# Patient Record
Sex: MALE | Race: WHITE | NOT HISPANIC OR LATINO | URBAN - METROPOLITAN AREA
[De-identification: names, ages, dates, MRNs, and addresses within clinical notes are randomized per-mention and may not be internally consistent; named-entity substitution may affect disease eponyms.]

---

## 2020-08-04 ENCOUNTER — OFFICE VISIT (OUTPATIENT)
Dept: URBAN - METROPOLITAN AREA CLINIC 23 | Facility: CLINIC | Age: 65
End: 2020-08-04
Payer: COMMERCIAL

## 2020-08-04 ENCOUNTER — LAB OUTSIDE AN ENCOUNTER (OUTPATIENT)
Dept: URBAN - METROPOLITAN AREA CLINIC 23 | Facility: CLINIC | Age: 65
End: 2020-08-04

## 2020-08-04 DIAGNOSIS — K74.60 CIRRHOSIS: ICD-10-CM

## 2020-08-04 PROCEDURE — 99213 OFFICE O/P EST LOW 20 MIN: CPT | Performed by: INTERNAL MEDICINE

## 2020-08-04 PROCEDURE — G8427 DOCREV CUR MEDS BY ELIG CLIN: HCPCS | Performed by: INTERNAL MEDICINE

## 2020-08-04 PROCEDURE — G9903 PT SCRN TBCO ID AS NON USER: HCPCS | Performed by: INTERNAL MEDICINE

## 2020-08-04 PROCEDURE — G8420 CALC BMI NORM PARAMETERS: HCPCS | Performed by: INTERNAL MEDICINE

## 2020-08-04 PROCEDURE — 3017F COLORECTAL CA SCREEN DOC REV: CPT | Performed by: INTERNAL MEDICINE

## 2020-08-04 RX ORDER — LISINOPRIL 30 MG/1
TAKE 1 TABLET (30 MG) BY ORAL ROUTE ONCE DAILY TABLET ORAL 1
Qty: 0 | Refills: 0 | Status: ACTIVE | COMMUNITY
Start: 1900-01-01

## 2020-08-04 RX ORDER — CARVEDILOL 12.5 MG/1
TABLET, FILM COATED ORAL
Qty: 0 | Refills: 0 | Status: ACTIVE | COMMUNITY
Start: 1900-01-01

## 2020-08-04 RX ORDER — FUROSEMIDE 40 MG/1
TABLET ORAL
Qty: 0 | Refills: 0 | Status: ACTIVE | COMMUNITY
Start: 1900-01-01

## 2020-08-04 RX ORDER — LISINOPRIL 40 MG/1
TABLET ORAL 1
Qty: 0 | Refills: 0 | Status: ACTIVE | COMMUNITY
Start: 1900-01-01

## 2020-08-04 NOTE — HPI-TODAY'S VISIT:
64-year-old male presented for follow up visit, he has history of hepatitis C cirrhosis which was treated and documented SVR. Recent liver enzymes in March 2020 was normal, presented today for Follow-up he had occasional pain mostly in the right upper and epigastric area pain is 2/10 into it it occasionally associated with bloating. He had EGD and colonoscopy in February 2020, his upper endoscopy was normal no varices his colonoscopy reveal few diverticulosis otherwise normal, his last ultrasound was 6 months ago, no nausea no vomiting no weight loss

## 2020-08-13 LAB
A/G RATIO: 1.7
AFP, SERUM, TUMOR MARKER: 5.4
ALBUMIN: 4.7
ALKALINE PHOSPHATASE: 74
ALT (SGPT): 8
AST (SGOT): 18
BASO (ABSOLUTE): 0.1
BASOS: 1
BILIRUBIN, TOTAL: 0.3
BUN/CREATININE RATIO: 18
BUN: 19
CALCIUM: 9.4
CARBON DIOXIDE, TOTAL: 21
CHLORIDE: 102
CREATININE: 1.05
EGFR IF AFRICN AM: 85
EGFR IF NONAFRICN AM: 74
EOS (ABSOLUTE): 0.2
EOS: 3
GLOBULIN, TOTAL: 2.8
GLUCOSE: 136
HEMATOCRIT: 40.8
HEMATOLOGY COMMENTS:: (no result)
HEMOGLOBIN: 13.5
IMMATURE CELLS: (no result)
IMMATURE GRANS (ABS): (no result)
IMMATURE GRANULOCYTES: (no result)
LYMPHS (ABSOLUTE): 2.2
LYMPHS: 37
MCH: 29.9
MCHC: 33.1
MCV: 91
MONOCYTES(ABSOLUTE): 0.9
MONOCYTES: 15
NEUTROPHILS (ABSOLUTE): 2.6
NEUTROPHILS: 44
NRBC: (no result)
PLATELETS: (no result)
POTASSIUM: 5.4
PROTEIN, TOTAL: 7.5
RBC: 4.51
RDW: 13.1
SODIUM: 139
WBC: 5.9

## 2020-09-22 ENCOUNTER — LAB OUTSIDE AN ENCOUNTER (OUTPATIENT)
Dept: URBAN - METROPOLITAN AREA CLINIC 92 | Facility: CLINIC | Age: 65
End: 2020-09-22

## 2020-09-22 ENCOUNTER — TELEPHONE ENCOUNTER (OUTPATIENT)
Dept: URBAN - METROPOLITAN AREA CLINIC 92 | Facility: CLINIC | Age: 65
End: 2020-09-22

## 2020-09-24 ENCOUNTER — WEB ENCOUNTER (OUTPATIENT)
Dept: URBAN - METROPOLITAN AREA CLINIC 92 | Facility: CLINIC | Age: 65
End: 2020-09-24

## 2020-10-07 ENCOUNTER — TELEPHONE ENCOUNTER (OUTPATIENT)
Dept: URBAN - METROPOLITAN AREA CLINIC 23 | Facility: CLINIC | Age: 65
End: 2020-10-07

## 2020-11-04 ENCOUNTER — TELEPHONE ENCOUNTER (OUTPATIENT)
Dept: URBAN - METROPOLITAN AREA CLINIC 92 | Facility: CLINIC | Age: 65
End: 2020-11-04

## 2020-11-06 ENCOUNTER — TELEPHONE ENCOUNTER (OUTPATIENT)
Dept: URBAN - METROPOLITAN AREA CLINIC 23 | Facility: CLINIC | Age: 65
End: 2020-11-06

## 2020-12-08 ENCOUNTER — TELEPHONE ENCOUNTER (OUTPATIENT)
Dept: URBAN - METROPOLITAN AREA MEDICAL CENTER 27 | Facility: MEDICAL CENTER | Age: 65
End: 2020-12-08

## 2020-12-17 ENCOUNTER — TELEPHONE ENCOUNTER (OUTPATIENT)
Dept: URBAN - METROPOLITAN AREA CLINIC 23 | Facility: CLINIC | Age: 65
End: 2020-12-17

## 2020-12-31 ENCOUNTER — LAB OUTSIDE AN ENCOUNTER (OUTPATIENT)
Dept: URBAN - METROPOLITAN AREA CLINIC 23 | Facility: CLINIC | Age: 65
End: 2020-12-31

## 2020-12-31 LAB
CREATININE POC: 1
PERFORMING LAB: (no result)

## 2021-01-29 ENCOUNTER — TELEPHONE ENCOUNTER (OUTPATIENT)
Dept: URBAN - METROPOLITAN AREA CLINIC 23 | Facility: CLINIC | Age: 66
End: 2021-01-29

## 2021-02-02 ENCOUNTER — OFFICE VISIT (OUTPATIENT)
Dept: URBAN - METROPOLITAN AREA CLINIC 23 | Facility: CLINIC | Age: 66
End: 2021-02-02
Payer: COMMERCIAL

## 2021-02-02 ENCOUNTER — TELEPHONE ENCOUNTER (OUTPATIENT)
Dept: URBAN - METROPOLITAN AREA CLINIC 23 | Facility: CLINIC | Age: 66
End: 2021-02-02

## 2021-02-02 DIAGNOSIS — K74.60 CIRRHOSIS: ICD-10-CM

## 2021-02-02 DIAGNOSIS — C22.0 HEPATOMA: ICD-10-CM

## 2021-02-02 PROCEDURE — G8482 FLU IMMUNIZE ORDER/ADMIN: HCPCS | Performed by: INTERNAL MEDICINE

## 2021-02-02 PROCEDURE — G9903 PT SCRN TBCO ID AS NON USER: HCPCS | Performed by: INTERNAL MEDICINE

## 2021-02-02 PROCEDURE — 99214 OFFICE O/P EST MOD 30 MIN: CPT | Performed by: INTERNAL MEDICINE

## 2021-02-02 PROCEDURE — G8427 DOCREV CUR MEDS BY ELIG CLIN: HCPCS | Performed by: INTERNAL MEDICINE

## 2021-02-02 PROCEDURE — 3017F COLORECTAL CA SCREEN DOC REV: CPT | Performed by: INTERNAL MEDICINE

## 2021-02-02 RX ORDER — LISINOPRIL 30 MG/1
TAKE 1 TABLET (30 MG) BY ORAL ROUTE ONCE DAILY TABLET ORAL 1
Qty: 0 | Refills: 0 | Status: ACTIVE | COMMUNITY
Start: 1900-01-01

## 2021-02-02 RX ORDER — CARVEDILOL 12.5 MG/1
TABLET, FILM COATED ORAL
Qty: 0 | Refills: 0 | Status: ACTIVE | COMMUNITY
Start: 1900-01-01

## 2021-02-02 RX ORDER — LISINOPRIL 40 MG/1
TABLET ORAL 1
Qty: 0 | Refills: 0 | Status: ACTIVE | COMMUNITY
Start: 1900-01-01

## 2021-02-02 RX ORDER — FUROSEMIDE 40 MG/1
TABLET ORAL
Qty: 0 | Refills: 0 | Status: ACTIVE | COMMUNITY
Start: 1900-01-01

## 2021-02-02 NOTE — HPI-TODAY'S VISIT:
65-year-old male presented For Follow-up after abnormal  ultrasound and MRI done for hepatoma screening. The patient has history of compensated  cirrhosis from hepatitis C which was treated and documented SVR in 2016 the hepatitis c and cirrhosis was treated and diagned by Dr Banks , . I saw him in  August 2020 he had screening ultrasound in September which revealed 1.5 cm New liver lesion in the right hepatic lobe. He had Follow-up MRI which was denied first by the insurance then after appeal  it was approved his MRI revealed 1.9 cm lesion in segment 6 and 2.7 enhancing lesion in segment 8 suspicious for hepatoma. He had right upper quadrant abdominal pain.  after the MRI  we had multiple attempts to contact the patient but he denied response to certified mail letter (did not sign it ) . We left several voice mail message  but he did not follow up til last week  He reports he was affraied of the results and did not want to hear it .   I had  long discussion with him in the office about the importance of Follow-up nd the seriousness of this finding    he had occasional pain mostly in the right upper and epigastric area pain is 2/10 into it it occasionally associated with bloating. He had EGD and colonoscopy in February 2020, his upper endoscopy was normal no varices his colonoscopy reveal few diverticulosis otherwise normal, his last ultrasound was 6 months ago, no nausea no vomiting no weight loss

## 2021-02-03 LAB
A/G RATIO: 1.6
AFP, SERUM, TUMOR MARKER: 4.9
ALBUMIN: 4.5
ALKALINE PHOSPHATASE: 81
AST (SGOT): 16
BASO (ABSOLUTE): 0.1
BASOS: 1
BILIRUBIN, TOTAL: 0.5
BUN/CREATININE RATIO: 21
BUN: 24
CALCIUM: 9.5
CARBON DIOXIDE, TOTAL: 22
CHLORIDE: 100
CREATININE: 1.15
EGFR IF AFRICN AM: 76
EGFR IF NONAFRICN AM: 65
EOS (ABSOLUTE): 0.3
EOS: 3
GLOBULIN, TOTAL: 2.9
GLUCOSE: 103
HEMATOCRIT: 39.4
HEMATOLOGY COMMENTS:: (no result)
HEMOGLOBIN: 12.7
IMMATURE CELLS: (no result)
IMMATURE GRANS (ABS): 0
IMMATURE GRANULOCYTES: 0
INR: 1.1
LYMPHS (ABSOLUTE): 3.2
LYMPHS: 42
MCH: 29.3
MCHC: 32.2
MCV: 91
MONOCYTES(ABSOLUTE): 0.8
MONOCYTES: 10
NEUTROPHILS (ABSOLUTE): 3.3
NEUTROPHILS: 44
NRBC: (no result)
PLATELETS: 263
POTASSIUM: 4.6
PROTEIN, TOTAL: 7.4
PROTHROMBIN TIME: 11.4
RBC: 4.34
RDW: 13.1
SODIUM: 138
WBC: 7.6

## 2021-02-11 ENCOUNTER — TELEPHONE ENCOUNTER (OUTPATIENT)
Dept: URBAN - METROPOLITAN AREA MEDICAL CENTER 27 | Facility: MEDICAL CENTER | Age: 66
End: 2021-02-11

## 2021-08-06 ENCOUNTER — OFFICE VISIT (OUTPATIENT)
Dept: URBAN - METROPOLITAN AREA CLINIC 23 | Facility: CLINIC | Age: 66
End: 2021-08-06
Payer: COMMERCIAL

## 2021-08-06 DIAGNOSIS — C22.0 HEPATOMA: ICD-10-CM

## 2021-08-06 DIAGNOSIS — K74.60 CIRRHOSIS: ICD-10-CM

## 2021-08-06 PROCEDURE — 99214 OFFICE O/P EST MOD 30 MIN: CPT | Performed by: INTERNAL MEDICINE

## 2021-08-06 RX ORDER — CARVEDILOL 12.5 MG/1
TABLET, FILM COATED ORAL
Qty: 0 | Refills: 0 | Status: ACTIVE | COMMUNITY
Start: 1900-01-01

## 2021-08-06 RX ORDER — LISINOPRIL 40 MG/1
TABLET ORAL 1
Qty: 0 | Refills: 0 | Status: ACTIVE | COMMUNITY
Start: 1900-01-01

## 2021-08-06 RX ORDER — LISINOPRIL 30 MG/1
TAKE 1 TABLET (30 MG) BY ORAL ROUTE ONCE DAILY TABLET ORAL 1
Qty: 0 | Refills: 0 | Status: ACTIVE | COMMUNITY
Start: 1900-01-01

## 2021-08-06 RX ORDER — FUROSEMIDE 40 MG/1
TABLET ORAL
Qty: 0 | Refills: 0 | Status: ACTIVE | COMMUNITY
Start: 1900-01-01

## 2021-08-06 NOTE — HPI-TODAY'S VISIT:
65-year-old male presented For Follow-up .  He was diagnosed with hepatocellular carcinoma in December 2020 by MRI.  He has history of hepatitis C cirrhosis now SVR imaging study revealed highly suspicious for hepatocellular carcinoma he was referred to [facility], his liver function test was normal meld score 9, MRI at [facility] revealed 2 lesion at 3.5 cm and 1.8 cm consistent with hepatocellular carcinoma in segment 6 and 8.  He had TACE treatment at [facility] responded to it well had follow-up MRI in May 2021 revealed small size lesion and good response to TACE treatment.  He had history of positive PPD was treated with INH for 6 months today feels much better no GI symptoms no abdominal pain no nausea no vomiting.  He had EGD and colonoscopy in February 2020 his upper endoscopy was unremarkable no varices his colonoscopy although was normal

## 2021-08-07 LAB
A/G RATIO: 1.5
AFP, SERUM, TUMOR MARKER: 3.5
ALBUMIN: 4.6
ALKALINE PHOSPHATASE: 178
AST (SGOT): 29
BASO (ABSOLUTE): 0.1
BASOS: 1
BILIRUBIN, TOTAL: 0.7
BUN/CREATININE RATIO: 27
BUN: 31
CALCIUM: 9.4
CARBON DIOXIDE, TOTAL: 22
CHLORIDE: 101
CREATININE: 1.16
EGFR IF AFRICN AM: 75
EGFR IF NONAFRICN AM: 65
EOS (ABSOLUTE): 0.2
EOS: 3
GLOBULIN, TOTAL: 3.1
GLUCOSE: 138
HEMATOCRIT: 40.6
HEMATOLOGY COMMENTS:: (no result)
HEMOGLOBIN: 13.6
IMMATURE CELLS: (no result)
IMMATURE GRANS (ABS): 0
IMMATURE GRANULOCYTES: 0
LYMPHS (ABSOLUTE): 1.4
LYMPHS: 26
MCH: 30.3
MCHC: 33.5
MCV: 90
MONOCYTES(ABSOLUTE): 0.5
MONOCYTES: 8
NEUTROPHILS (ABSOLUTE): 3.4
NEUTROPHILS: 62
NRBC: (no result)
PLATELETS: 245
POTASSIUM: 5
PROTEIN, TOTAL: 7.7
RBC: 4.49
RDW: 14
SODIUM: 136
WBC: 5.6

## 2021-08-10 ENCOUNTER — WEB ENCOUNTER (OUTPATIENT)
Dept: URBAN - METROPOLITAN AREA CLINIC 23 | Facility: CLINIC | Age: 66
End: 2021-08-10

## 2022-07-07 ENCOUNTER — OFFICE VISIT (OUTPATIENT)
Dept: URBAN - METROPOLITAN AREA CLINIC 33 | Facility: CLINIC | Age: 67
End: 2022-07-07
Payer: COMMERCIAL

## 2022-07-07 VITALS
SYSTOLIC BLOOD PRESSURE: 148 MMHG | HEIGHT: 67 IN | OXYGEN SATURATION: 93 % | BODY MASS INDEX: 27.15 KG/M2 | DIASTOLIC BLOOD PRESSURE: 84 MMHG | HEART RATE: 58 BPM | WEIGHT: 173 LBS

## 2022-07-07 DIAGNOSIS — C22.0 HEPATOMA: ICD-10-CM

## 2022-07-07 DIAGNOSIS — K74.60 CIRRHOSIS: ICD-10-CM

## 2022-07-07 DIAGNOSIS — R10.11 RIGHT UPPER QUADRANT ABDOMINAL PAIN: ICD-10-CM

## 2022-07-07 PROCEDURE — 99214 OFFICE O/P EST MOD 30 MIN: CPT | Performed by: INTERNAL MEDICINE

## 2022-07-07 RX ORDER — FUROSEMIDE 40 MG/1
TABLET ORAL
Qty: 0 | Refills: 0 | Status: DISCONTINUED | COMMUNITY
Start: 1900-01-01

## 2022-07-07 RX ORDER — CARVEDILOL 12.5 MG/1
TABLET, FILM COATED ORAL
Qty: 0 | Refills: 0 | Status: ACTIVE | COMMUNITY
Start: 1900-01-01

## 2022-07-07 RX ORDER — TESTOSTERONE CYPIONATE 200 MG/ML
INJECTION, SOLUTION INTRAMUSCULAR
Qty: 3 MILLILITER | Status: ACTIVE | COMMUNITY

## 2022-07-07 RX ORDER — ALBUTEROL SULFATE 108 UG/1
AEROSOL, METERED RESPIRATORY (INHALATION)
Qty: 6.7 GRAM | Status: ACTIVE | COMMUNITY

## 2022-07-07 RX ORDER — BUPROPION HYDROCHLORIDE 150 MG/1
TABLET, EXTENDED RELEASE ORAL
Qty: 90 TABLET | Status: ACTIVE | COMMUNITY

## 2022-07-07 RX ORDER — ATORVASTATIN CALCIUM 20 MG/1
TABLET, FILM COATED ORAL
Qty: 90 TABLET | Status: ACTIVE | COMMUNITY

## 2022-07-07 RX ORDER — LISINOPRIL 40 MG/1
TABLET ORAL 1
Qty: 0 | Refills: 0 | Status: ACTIVE | COMMUNITY
Start: 1900-01-01

## 2022-07-07 RX ORDER — TAMSULOSIN HYDROCHLORIDE 0.4 MG/1
CAPSULE ORAL
Qty: 90 CAPSULE | Status: ACTIVE | COMMUNITY

## 2022-07-07 NOTE — HPI-CIRRHOSIS
66 year old male presents today for a consultation and transition of care from Dr. Castelan for cirrhosis and liver cancer.  He was diagnosed with hepatocellular carcinoma in December 2020 via MRI. He has history of hepatitis C cirrhosis.  Patient state he was initially diagnosed with Hep C 40 years ago, at which time treated with  Interferon and Ribavirin.  SIx years ago Hep C positive again and treated with another medication he can not recall.  He report diagnosed with Cirrhosis of Liver 30 years ago and followed by GI Dr. Banks over the course of 15 years, till he retired then referred to Dr. Castelan who has followed patient over the past 6 years.  Last visit with Dr. Castelan (8/6/2021) .   He was referred to [facility] due to SVR imaging study revealed highly suspicious for hepatocellular carcinoma.  He has been followed by Dr. Marks over the past 3 years.  Last visit (2/2022) and repeat MRI Abdomen every 3 months.  He is scheduled for next MRI in Aug. 2022.  He had TACE treatment at [facility] responded to it well.  Follow-up MRI Abdomen on (5/28/2022) Stable treated lesions of the segment 8 and segment 6 liver without viable tumor (LR TR nonviable).  Multiple (at least 7) arterial enhancing foci throughout the liver parenchyma are unchanged without washout and remain indeterminate. Attention on follow-up imaging recommended (LR  3). Mild cirrhotic liver morphology with mild changes of portal hypertension in the form of upper abdominal varices.  Main portal vein is patent.  Other incidental and nonemergenct findings as above.  He reports a 3-year history of dizziness episodes.  Described as imbalance.  Bending over or over exertion has been an aggravating factor.  Sitting down to rest helps.  He has a 10 year h/o periodic episodes of RUQ.  Symptoms occur without provocation.  Described as a sharp pain that will last for seconds then dissipate.  He reports 40 years ago he had  a positive PPD was treated with INH for 6 months.  He had EGD in February 2020 his upper endoscopy was unremarkable .   Complications of liver disease include:  Jaundice: Hepatic Encephalopathy (lose of Brain function due to liver not working) : No Ascites (abdominal fluid): No Spontaneous Bacterial Peritonitis (ascites fluid infection): No Variceal hemorrhage (bleeding within the veins due to high B/P):No Portal Vein Thrombosis (blockage or narrowing of the portal vein): No Muscle Mass Loss:No Weight Loss:No Sleeping habits: Normal  He report a 10 year h/o periodic episodes of RUQ.  Symptoms occur without provocation.  Described as a sharp pain that will last for seconds then dissipate.

## 2022-08-29 ENCOUNTER — OFFICE VISIT (OUTPATIENT)
Dept: URBAN - METROPOLITAN AREA MEDICAL CENTER 27 | Facility: MEDICAL CENTER | Age: 67
End: 2022-08-29

## 2022-08-29 ENCOUNTER — CLAIMS CREATED FROM THE CLAIM WINDOW (OUTPATIENT)
Dept: URBAN - METROPOLITAN AREA MEDICAL CENTER 27 | Facility: MEDICAL CENTER | Age: 67
End: 2022-08-29
Payer: COMMERCIAL

## 2022-08-29 DIAGNOSIS — Z12.11 COLON CANCER SCREENING: ICD-10-CM

## 2022-08-29 PROCEDURE — 996 AG2 (NON BILLABLE): Performed by: INTERNAL MEDICINE

## 2022-09-16 ENCOUNTER — TELEPHONE ENCOUNTER (OUTPATIENT)
Dept: URBAN - METROPOLITAN AREA CLINIC 36 | Facility: CLINIC | Age: 67
End: 2022-09-16

## 2022-09-19 ENCOUNTER — OFFICE VISIT (OUTPATIENT)
Dept: URBAN - METROPOLITAN AREA CLINIC 33 | Facility: CLINIC | Age: 67
End: 2022-09-19

## 2022-09-19 NOTE — HPI-CIRRHOSIS
66 y/o male patient presents today for a follow up of cirrhosis. He admits any fatigue, easy bruising, decreased appetite, nausea, vomiting, edema, unintentional weight loss, or jaundice.   Most recent labs were completed --.   Admits/Denies any shortness of breath.   MELD Score: Complications of liver disease include: Jaundice:  Hepatic Encephalopathy: No Ascites: No Spontaneous Bacterial Peritonitis: No Variceal hemorrhage: No Portal Vein Thrombosis: No Muscle Mass Loss: No Weight Loss: No Sleeping habits: ?       Last visit (7/7/2022)  66 year old male presents today for a consultation and transition of care from Dr. Castelan for cirrhosis and liver cancer.  He was diagnosed with hepatocellular carcinoma in December 2020 via MRI. He has history of hepatitis C cirrhosis.  Patient state he was initially diagnosed with Hep C 40 years ago, at which time treated with  Interferon and Ribavirin.  SIx years ago Hep C positive again and treated with another medication he can not recall.  He report diagnosed with Cirrhosis of Liver 30 years ago and followed by GI Dr. Banks over the course of 15 years, till he retired then referred to Dr. Castelan who has followed patient over the past 6 years.  Last visit with Dr. Castelan (8/6/2021) .   He was referred to [facility] due to SVR imaging study revealed highly suspicious for hepatocellular carcinoma.  He has been followed by Dr. Marks over the past 3 years.  Last visit (2/2022) and repeat MRI Abdomen every 3 months.  He is scheduled for next MRI in Aug. 2022.  He had TACE treatment at [facility] responded to it well.  Follow-up MRI Abdomen on (5/28/2022) Stable treated lesions of the segment 8 and segment 6 liver without viable tumor (LR TR nonviable).  Multiple (at least 7) arterial enhancing foci throughout the liver parenchyma are unchanged without washout and remain indeterminate. Attention on follow-up imaging recommended (LR  3). Mild cirrhotic liver morphology with mild changes of portal hypertension in the form of upper abdominal varices.  Main portal vein is patent.  Other incidental and nonemergenct findings as above.  He reports a 3-year history of dizziness episodes.  Described as imbalance.  Bending over or over exertion has been an aggravating factor.  Sitting down to rest helps.  He has a 10 year h/o periodic episodes of RUQ.  Symptoms occur without provocation.  Described as a sharp pain that will last for seconds then dissipate.  He reports 40 years ago he had  a positive PPD was treated with INH for 6 months.  He had EGD in February 2020 his upper endoscopy was unremarkable .   Complications of liver disease include:  Jaundice: Hepatic Encephalopathy: No Ascites: No Spontaneous Bacterial Peritonitis: No Variceal hemorrhage: No Portal Vein Thrombosis: No Muscle Mass Loss: No Weight Loss: No Sleeping habits: Normal  He report a 10 year h/o periodic episodes of RUQ.  Symptoms occur without provocation.  Described as a sharp pain that will last for seconds then dissipate.

## 2022-09-26 ENCOUNTER — TELEPHONE ENCOUNTER (OUTPATIENT)
Dept: URBAN - METROPOLITAN AREA CLINIC 35 | Facility: CLINIC | Age: 67
End: 2022-09-26

## 2022-10-17 ENCOUNTER — OFFICE VISIT (OUTPATIENT)
Dept: URBAN - METROPOLITAN AREA MEDICAL CENTER 27 | Facility: MEDICAL CENTER | Age: 67
End: 2022-10-17
Payer: COMMERCIAL

## 2022-10-17 DIAGNOSIS — K29.30 CHRONIC SUPERFICIAL GASTRITIS: ICD-10-CM

## 2022-10-17 DIAGNOSIS — K22.89 DILATATION OF ESOPHAGUS: ICD-10-CM

## 2022-10-17 DIAGNOSIS — K74.69 CIRRHOSIS, CRYPTOGENIC: ICD-10-CM

## 2022-10-17 PROCEDURE — 43239 EGD BIOPSY SINGLE/MULTIPLE: CPT | Performed by: INTERNAL MEDICINE

## 2022-10-17 RX ORDER — CARVEDILOL 12.5 MG/1
TABLET, FILM COATED ORAL
Qty: 0 | Refills: 0 | Status: ACTIVE | COMMUNITY
Start: 1900-01-01

## 2022-10-17 RX ORDER — ATORVASTATIN CALCIUM 20 MG/1
TABLET, FILM COATED ORAL
Qty: 90 TABLET | Status: ACTIVE | COMMUNITY

## 2022-10-17 RX ORDER — BUPROPION HYDROCHLORIDE 150 MG/1
TABLET, EXTENDED RELEASE ORAL
Qty: 90 TABLET | Status: ACTIVE | COMMUNITY

## 2022-10-17 RX ORDER — TESTOSTERONE CYPIONATE 200 MG/ML
INJECTION, SOLUTION INTRAMUSCULAR
Qty: 3 MILLILITER | Status: ACTIVE | COMMUNITY

## 2022-10-17 RX ORDER — LISINOPRIL 40 MG/1
TABLET ORAL 1
Qty: 0 | Refills: 0 | Status: ACTIVE | COMMUNITY
Start: 1900-01-01

## 2022-10-17 RX ORDER — TAMSULOSIN HYDROCHLORIDE 0.4 MG/1
CAPSULE ORAL
Qty: 90 CAPSULE | Status: ACTIVE | COMMUNITY

## 2022-10-17 RX ORDER — ALBUTEROL SULFATE 108 UG/1
AEROSOL, METERED RESPIRATORY (INHALATION)
Qty: 6.7 GRAM | Status: ACTIVE | COMMUNITY

## 2022-11-10 ENCOUNTER — OFFICE VISIT (OUTPATIENT)
Dept: URBAN - METROPOLITAN AREA CLINIC 33 | Facility: CLINIC | Age: 67
End: 2022-11-10
Payer: COMMERCIAL

## 2022-11-10 VITALS
WEIGHT: 165.8 LBS | HEART RATE: 95 BPM | SYSTOLIC BLOOD PRESSURE: 125 MMHG | BODY MASS INDEX: 26.02 KG/M2 | DIASTOLIC BLOOD PRESSURE: 82 MMHG | HEIGHT: 67 IN | OXYGEN SATURATION: 93 %

## 2022-11-10 DIAGNOSIS — K74.60 CIRRHOSIS: ICD-10-CM

## 2022-11-10 DIAGNOSIS — R10.11 RIGHT UPPER QUADRANT ABDOMINAL PAIN: ICD-10-CM

## 2022-11-10 DIAGNOSIS — K44.9 HIATAL HERNIA: ICD-10-CM

## 2022-11-10 DIAGNOSIS — K29.70 GASTRITIS: ICD-10-CM

## 2022-11-10 DIAGNOSIS — C22.0 HEPATOMA: ICD-10-CM

## 2022-11-10 PROCEDURE — 99214 OFFICE O/P EST MOD 30 MIN: CPT | Performed by: INTERNAL MEDICINE

## 2022-11-10 RX ORDER — CARVEDILOL 12.5 MG/1
TABLET, FILM COATED ORAL
Qty: 0 | Refills: 0 | Status: ACTIVE | COMMUNITY
Start: 1900-01-01

## 2022-11-10 RX ORDER — TESTOSTERONE CYPIONATE 200 MG/ML
INJECTION, SOLUTION INTRAMUSCULAR
Qty: 3 MILLILITER | Status: ACTIVE | COMMUNITY

## 2022-11-10 RX ORDER — TAMSULOSIN HYDROCHLORIDE 0.4 MG/1
CAPSULE ORAL
Qty: 90 CAPSULE | Status: ACTIVE | COMMUNITY

## 2022-11-10 RX ORDER — ALBUTEROL SULFATE 108 UG/1
AEROSOL, METERED RESPIRATORY (INHALATION)
Qty: 6.7 GRAM | Status: ACTIVE | COMMUNITY

## 2022-11-10 RX ORDER — ATORVASTATIN CALCIUM 20 MG/1
TABLET, FILM COATED ORAL
Qty: 90 TABLET | Status: ACTIVE | COMMUNITY

## 2022-11-10 RX ORDER — BUPROPION HYDROCHLORIDE 150 MG/1
TABLET, EXTENDED RELEASE ORAL
Qty: 90 TABLET | Status: ACTIVE | COMMUNITY

## 2022-11-10 RX ORDER — LISINOPRIL 40 MG/1
TABLET ORAL 1
Qty: 0 | Refills: 0 | Status: ACTIVE | COMMUNITY
Start: 1900-01-01

## 2022-11-10 NOTE — HPI-CIRRHOSIS
66 y/o male patient presents today for a follow up of cirrhosis and to review his EGD results. He denies any complications post procedure. Since his procedure he denies any episodes of dysphagia, globus, a change in appetite or bowel habits.  He underwent an IR procedure at [facility] 3 weeks ago for the Hepatoma in October 2022. He will follow up with IR on November 15, 2023  Patient since having the procedue, he states that it feels like something is hanging in his throat.   He denies any fatigue, easy bruising, decreased appetite, nausea, vomiting, edema, unintentional weight loss, or jaundice.   Patient denies having any  recent labs were completed.  Denies any shortness of breath.   MELD Score:    Complications of liver disease include: Jaundice: No  Hepatic Encephalopathy: No Ascites: No Spontaneous Bacterial Peritonitis: No Variceal hemorrhage: No Portal Vein Thrombosis: No Muscle Mass Loss: No Weight Loss: No Sleeping habits: Yes (He states he probably gets at least 4-5hrs of sleep)     Last visit (7/7/2022)  66 year old male presents today for a consultation and transition of care from Dr. Castelan for cirrhosis and liver cancer.  He was diagnosed with hepatocellular carcinoma in December 2020 via MRI. He has history of hepatitis C cirrhosis.  Patient state he was initially diagnosed with Hep C 40 years ago, at which time treated with  Interferon and Ribavirin.  SIx years ago Hep C positive again and treated with another medication he can not recall.  He report diagnosed with Cirrhosis of Liver 30 years ago and followed by GI Dr. Banks over the course of 15 years, till he retired then referred to Dr. Castelan who has followed patient over the past 6 years.  Last visit with Dr. Castelan (8/6/2021) .   He was referred to [facility] due to SVR imaging study revealed highly suspicious for hepatocellular carcinoma.  He has been followed by Dr. Marks over the past 3 years.  Last visit (2/2022) and repeat MRI Abdomen every 3 months.  He is scheduled for next MRI in Aug. 2022.  He had TACE treatment at [facility] responded to it well.  Follow-up MRI Abdomen on (5/28/2022) Stable treated lesions of the segment 8 and segment 6 liver without viable tumor (LR TR nonviable).  Multiple (at least 7) arterial enhancing foci throughout the liver parenchyma are unchanged without washout and remain indeterminate. Attention on follow-up imaging recommended (LR  3). Mild cirrhotic liver morphology with mild changes of portal hypertension in the form of upper abdominal varices.  Main portal vein is patent.  Other incidental and nonemergenct findings as above.  He reports a 3-year history of dizziness episodes.  Described as imbalance.  Bending over or over exertion has been an aggravating factor.  Sitting down to rest helps.  He has a 10 year h/o periodic episodes of RUQ.  Symptoms occur without provocation.  Described as a sharp pain that will last for seconds then dissipate.  He reports 40 years ago he had  a positive PPD was treated with INH for 6 months.  He had EGD in February 2020 his upper endoscopy was unremarkable .   Complications of liver disease include:  Jaundice: Hepatic Encephalopathy: No Ascites: No Spontaneous Bacterial Peritonitis: No Variceal hemorrhage: No Portal Vein Thrombosis: No Muscle Mass Loss: No Weight Loss: No Sleeping habits: Normal  He report a 10 year h/o periodic episodes of RUQ.  Symptoms occur without provocation.  Described as a sharp pain that will last for seconds then dissipate.

## 2022-11-17 LAB
A/G RATIO: 1.3
AFP, SERUM, TUMOR MARKER: 4
ALBUMIN: 4.2
ALKALINE PHOSPHATASE: 245
ALT (SGPT): 39
AST (SGOT): 50
BILIRUBIN, TOTAL: 0.6
BUN/CREATININE RATIO: 29
BUN: 28
CALCIUM: 9.4
CARBON DIOXIDE, TOTAL: 24
CHLORIDE: 100
CREATININE: 0.98
EGFR: 83
GLOBULIN, TOTAL: 3.3
GLUCOSE: 118
HEMOGLOBIN A1C: 6.4
INR: 1.7
POTASSIUM: 4.9
PROTEIN, TOTAL: 7.5
PROTHROMBIN TIME: 17.2
SODIUM: 139

## 2023-02-21 ENCOUNTER — TELEPHONE ENCOUNTER (OUTPATIENT)
Dept: URBAN - METROPOLITAN AREA CLINIC 35 | Facility: CLINIC | Age: 68
End: 2023-02-21

## 2023-02-27 ENCOUNTER — OFFICE VISIT (OUTPATIENT)
Dept: URBAN - METROPOLITAN AREA CLINIC 33 | Facility: CLINIC | Age: 68
End: 2023-02-27
Payer: COMMERCIAL

## 2023-02-27 VITALS
WEIGHT: 168.6 LBS | OXYGEN SATURATION: 94 % | HEIGHT: 67 IN | DIASTOLIC BLOOD PRESSURE: 84 MMHG | BODY MASS INDEX: 26.46 KG/M2 | HEART RATE: 70 BPM | SYSTOLIC BLOOD PRESSURE: 160 MMHG

## 2023-02-27 DIAGNOSIS — C22.0 HEPATOMA: ICD-10-CM

## 2023-02-27 DIAGNOSIS — K74.60 CIRRHOSIS: ICD-10-CM

## 2023-02-27 DIAGNOSIS — T40.2X5A ADVERSE EFFECT OF OTHER OPIOIDS, INITIAL ENCOUNTER: ICD-10-CM

## 2023-02-27 DIAGNOSIS — K44.9 HIATAL HERNIA: ICD-10-CM

## 2023-02-27 DIAGNOSIS — K29.70 GASTRITIS: ICD-10-CM

## 2023-02-27 DIAGNOSIS — K59.03 DRUG INDUCED CONSTIPATION: ICD-10-CM

## 2023-02-27 DIAGNOSIS — R10.11 RIGHT UPPER QUADRANT ABDOMINAL PAIN: ICD-10-CM

## 2023-02-27 PROBLEM — 255417007 CIRRHOTIC: Status: ACTIVE | Noted: 2020-10-07

## 2023-02-27 PROBLEM — 301717006: Status: ACTIVE | Noted: 2022-07-21

## 2023-02-27 PROBLEM — 126851005 HEPATOMA: Status: ACTIVE | Noted: 2021-02-02

## 2023-02-27 PROBLEM — 4556007 GASTRITIS: Status: ACTIVE | Noted: 2022-11-08

## 2023-02-27 PROBLEM — 84089009 HIATAL HERNIA: Status: ACTIVE | Noted: 2022-11-08

## 2023-02-27 PROCEDURE — 99214 OFFICE O/P EST MOD 30 MIN: CPT | Performed by: INTERNAL MEDICINE

## 2023-02-27 RX ORDER — TAMSULOSIN HYDROCHLORIDE 0.4 MG/1
CAPSULE ORAL
Qty: 90 CAPSULE | Status: ACTIVE | COMMUNITY

## 2023-02-27 RX ORDER — CARVEDILOL 12.5 MG/1
TABLET, FILM COATED ORAL
Qty: 0 | Refills: 0 | Status: ACTIVE | COMMUNITY
Start: 1900-01-01

## 2023-02-27 RX ORDER — ALBUTEROL SULFATE 108 UG/1
AEROSOL, METERED RESPIRATORY (INHALATION)
Qty: 6.7 GRAM | Status: ACTIVE | COMMUNITY

## 2023-02-27 RX ORDER — TESTOSTERONE CYPIONATE 200 MG/ML
INJECTION, SOLUTION INTRAMUSCULAR
Qty: 3 MILLILITER | Status: ACTIVE | COMMUNITY

## 2023-02-27 RX ORDER — ATORVASTATIN CALCIUM 20 MG/1
TABLET, FILM COATED ORAL
Qty: 90 TABLET | Status: ACTIVE | COMMUNITY

## 2023-02-27 RX ORDER — LISINOPRIL 40 MG/1
TABLET ORAL 1
Qty: 0 | Refills: 0 | Status: ACTIVE | COMMUNITY
Start: 1900-01-01

## 2023-02-27 RX ORDER — NALOXEGOL OXALATE 25 MG/1
1 TABLET IN THE MORNING TABLET, FILM COATED ORAL ONCE A DAY
Qty: 90 TABLET | Refills: 3 | OUTPATIENT
Start: 2023-02-27 | End: 2024-02-22

## 2023-02-27 RX ORDER — BUPROPION HYDROCHLORIDE 150 MG/1
TABLET, EXTENDED RELEASE ORAL
Qty: 90 TABLET | Status: ACTIVE | COMMUNITY

## 2023-02-27 NOTE — HPI-ABDOMINAL PAIN
Patient admits experiencing some left sided abdomen pain and flank pain. The patient had a  accident 10 days ago with some trauma ( fell off the truck ). Patient describe the pain as an burning sensation. Onset started about a month ago, and is present all day.  Denies any aggravating factors. Denies any alleviating factors.     He denies taking any OTC or prescribe medication for relief of his symptoms.

## 2023-02-27 NOTE — HPI-CONSTIPATION
Patient admits symptoms of constipation.  Patient admits a lonstanding history of constipation. He states his symptoms of constipation has worsen over the years. Patient states he can go 2-3 days without having a bowel movement. He admits taking OTC milk of magnesia with improved symptoms. Patient states when he take the milk of magnesia he can have at least 1-2 bowel movments.    Patient states his stools varies between hard/loose without the presence of blood, mucus, and melena. He denies any rectal pain or pruritus ani.

## 2023-02-27 NOTE — HPI-CIRRHOSIS
Patient presents today for a follow up of cirrhosis. He debies any recent episodes of fatigue, easy bruising, decreased appetite, nausea, vomiting, edema, unintentional weight loss, or jaundice.   Denies any shortness of breath.  He denies any alcohol intake since his last visit.   Most recent labs were performed 11/16/2022 MELD Score: 12   Complications of liver disease include: Jaundice: No  Hepatic Encephalopathy: No Ascites: No Spontaneous Bacterial Peritonitis: No Variceal hemorrhage: No Portal Vein Thrombosis: No Muscle Mass Loss: No Weight Loss: No Sleeping habits: Yes (He states he probably gets at least 4-5hrs of sleep)  US Renal (02.25.2023) Bilateral renal cysts as described. 7mm nonobstructing right renal calculus.  CT Chest w/o Contrast (02.16.2023) Interval solution of previously described 7mm left upper lobe pulmonary nodul. Other small bilateral pulmonary nodules are unchanged since 03.31.2021 and likely of benign etiology given stability. No new suspicious pulmonary nodules or masses. No definite metastatic diseases of the chest by noncontrast CT. Stable appearance of diffuse bilateral subpleural lung reticulation and pleural thickening suggesting fibrosis. No definite new active pulmonary diseases. Cirrhotic liver morphology and treatment changes of the liver. Consider reassessment contrast-ehanced abdominal MRI as warrented.   MRI Abdomen w/wo Contrast (02.25.2023) Interval Y-90 and microwave ablation of segment 8 liver lesion without viable tumor (LR TR nonviable).  Interval Y-90 of a segment 8 liver lesion with somewhat nodular perheral delayed enhancement, favored reflect treatment change. No definite residual suspicious enhancing lesion identified (LR TR nonviable). Stable more remote treated lesions of the segment 8 and segment 6 liver without viable tumor (LR TR nonviable). Previously describe arterial enhancing liver lesion are less conspicuous on the the current study likely secondary to motion degradation. Attention on follow-up imaging recommended (LR3). Nospecific enlarging cardiophrenic node. Mild cirrhotic liver morphology with mild changes of portal hypertension in the form of upper abdominal varices. Main portal vein is patent. New linear signal abnormality along the T12 vertebral body could reflect an age-indeterminated compression fracture. This can be further assessed with dedicated CT or MRI spine imaging as warrented. Moderate colonic stool burden suggests constipation. Other stable an incidental findings as above.      Last visit (11/10/2022)  66 y/o male patient presents today for a follow up of cirrhosis and to review his EGD results. He denies any complications post procedure. Since his procedure he denies any episodes of dysphagia, globus, a change in appetite or bowel habits.  He underwent an IR procedure at [facility] 3 weeks ago for the Hepatoma in October 2022. He will follow up with IR on November 15, 2023  Patient since having the procedue, he states that it feels like something is hanging in his throat.   He denies any fatigue, easy bruising, decreased appetite, nausea, vomiting, edema, unintentional weight loss, or jaundice.   Patient denies having any  recent labs were completed.  Denies any shortness of breath.   He had an intervention by IR ( Y - 90 ) for the hepatoma in October25 2022  MELD Score:    Complications of liver disease include: Jaundice: No  Hepatic Encephalopathy: No Ascites: No Spontaneous Bacterial Peritonitis: No Variceal hemorrhage: No Portal Vein Thrombosis: No Muscle Mass Loss: No Weight Loss: No Sleeping habits: Yes (He states he probably gets at least 4-5hrs of sleep)

## 2023-03-06 ENCOUNTER — TELEPHONE ENCOUNTER (OUTPATIENT)
Dept: URBAN - METROPOLITAN AREA CLINIC 33 | Facility: CLINIC | Age: 68
End: 2023-03-06

## 2023-04-20 ENCOUNTER — OFFICE VISIT (OUTPATIENT)
Dept: URBAN - METROPOLITAN AREA CLINIC 33 | Facility: CLINIC | Age: 68
End: 2023-04-20

## 2023-04-20 RX ORDER — TESTOSTERONE CYPIONATE 200 MG/ML
INJECTION, SOLUTION INTRAMUSCULAR
Qty: 3 MILLILITER | Status: ACTIVE | COMMUNITY

## 2023-04-20 RX ORDER — TAMSULOSIN HYDROCHLORIDE 0.4 MG/1
CAPSULE ORAL
Qty: 90 CAPSULE | Status: ACTIVE | COMMUNITY

## 2023-04-20 RX ORDER — NALOXEGOL OXALATE 25 MG/1
1 TABLET IN THE MORNING TABLET, FILM COATED ORAL ONCE A DAY
Qty: 90 TABLET | Refills: 3 | Status: ACTIVE | COMMUNITY
Start: 2023-02-27 | End: 2024-02-22

## 2023-04-20 RX ORDER — ALBUTEROL SULFATE 108 UG/1
AEROSOL, METERED RESPIRATORY (INHALATION)
Qty: 6.7 GRAM | Status: ACTIVE | COMMUNITY

## 2023-04-20 RX ORDER — ATORVASTATIN CALCIUM 20 MG/1
TABLET, FILM COATED ORAL
Qty: 90 TABLET | Status: ACTIVE | COMMUNITY

## 2023-04-20 RX ORDER — BUPROPION HYDROCHLORIDE 150 MG/1
TABLET, EXTENDED RELEASE ORAL
Qty: 90 TABLET | Status: ACTIVE | COMMUNITY

## 2023-04-20 RX ORDER — CARVEDILOL 12.5 MG/1
TABLET, FILM COATED ORAL
Qty: 0 | Refills: 0 | Status: ACTIVE | COMMUNITY
Start: 1900-01-01

## 2023-04-20 RX ORDER — LISINOPRIL 40 MG/1
TABLET ORAL 1
Qty: 0 | Refills: 0 | Status: ACTIVE | COMMUNITY
Start: 1900-01-01

## 2023-04-20 NOTE — HPI-CIRRHOSIS
Patient presents today for a follow up of cirrhosis. He admits/denies any fatigue, easy bruising, decreased appetite, nausea, vomiting, edema, unintentional weight loss, or jaundice. Most recent labs were completed 03/25/2023. Admits/Denies any shortness of breath.  MELD Score:  Complications of liver disease include:  Jaundice:  Hepatic Encephalopathy (lose of Brain function due to liver not working) : Yes/No  Ascites (abdominal fluid): Yes/No  Spontaneous Bacterial Peritonitis (ascites fluid infection): Yes/No  Variceal hemorrhage (bleeding within the veins due to high B/P): Yes/No  Portal Vein Thrombosis (blockage or narrowing of the portal vein): Yes/No  Muscle Mass Loss: Yes/No  Weight Loss: Yes/No  Sleeping habits: -- hours per night.  NAFLD Score:   Last office visit 02/27/2023 Patient presents today for a follow up of cirrhosis. He denies any recent episodes of fatigue, easy bruising, decreased appetite, nausea, vomiting, edema, unintentional weight loss, or jaundice.   Denies any shortness of breath.  He denies any alcohol intake since his last visit.   Most recent labs were performed 11/16/2022 MELD Score: 12   Complications of liver disease include: Jaundice: No  Hepatic Encephalopathy: No Ascites: No Spontaneous Bacterial Peritonitis: No Variceal hemorrhage: No Portal Vein Thrombosis: No Muscle Mass Loss: No Weight Loss: No Sleeping habits: Yes (He states he probably gets at least 4-5hrs of sleep)  US Renal (02.25.2023) Bilateral renal cysts as described. 7mm nonobstructing right renal calculus.  CT Chest w/o Contrast (02.16.2023) Interval solution of previously described 7mm left upper lobe pulmonary nodul. Other small bilateral pulmonary nodules are unchanged since 03.31.2021 and likely of benign etiology given stability. No new suspicious pulmonary nodules or masses. No definite metastatic diseases of the chest by noncontrast CT. Stable appearance of diffuse bilateral subpleural lung reticulation and pleural thickening suggesting fibrosis. No definite new active pulmonary diseases. Cirrhotic liver morphology and treatment changes of the liver. Consider reassessment contrast-ehanced abdominal MRI as warrented.   MRI Abdomen w/wo Contrast (02.25.2023) Interval Y-90 and microwave ablation of segment 8 liver lesion without viable tumor (LR TR nonviable).  Interval Y-90 of a segment 8 liver lesion with somewhat nodular perheral delayed enhancement, favored reflect treatment change. No definite residual suspicious enhancing lesion identified (LR TR nonviable). Stable more remote treated lesions of the segment 8 and segment 6 liver without viable tumor (LR TR nonviable). Previously describe arterial enhancing liver lesion are less conspicuous on the the current study likely secondary to motion degradation. Attention on follow-up imaging recommended (LR3). Nospecific enlarging cardiophrenic node. Mild cirrhotic liver morphology with mild changes of portal hypertension in the form of upper abdominal varices. Main portal vein is patent. New linear signal abnormality along the T12 vertebral body could reflect an age-indeterminated compression fracture. This can be further assessed with dedicated CT or MRI spine imaging as warrented. Moderate colonic stool burden suggests constipation. Other stable an incidental findings as above.

## 2023-04-20 NOTE — HPI-CONSTIPATION
Patient currently admits -- bowel movements per --, with/without strain. Stools are --. Admits/Denies any blood, mucus, or melena in stools. He admits/denies any pruritus ani or rectal pain.     Last office visit 02/27/2023 Patient admits symptoms of constipation.  Patient admits a lonstanding history of constipation. He states his symptoms of constipation has worsen over the years. Patient states he can go 2-3 days without having a bowel movement. He admits taking OTC milk of magnesia with improved symptoms. Patient states when he take the milk of magnesia he can have at least 1-2 bowel movments.    Patient states his stools varies between hard/loose without the presence of blood, mucus, and melena. He denies any rectal pain or pruritus ani.

## 2023-04-20 NOTE — HPI-ABDOMINAL PAIN
Patient presents today for abdominal pain that is located --. He describes pain as a ---. Pain onset --, with episodes occurring every -- and lasting --.He has tried -- with -- relief of symptoms.     Last office visit 02/27/2023 Patient admits experiencing some left sided abdomen pain and flank pain. The patient had a  accident 10 days ago with some trauma ( fell off the truck ). Patient describe the pain as an burning sensation. Onset started about a month ago, and is present all day.  Denies any aggravating factors. Denies any alleviating factors.     He denies taking any OTC or prescribe medication for relief of his symptoms.

## 2023-06-26 ENCOUNTER — TELEPHONE ENCOUNTER (OUTPATIENT)
Dept: URBAN - METROPOLITAN AREA CLINIC 33 | Facility: CLINIC | Age: 68
End: 2023-06-26

## 2023-06-26 ENCOUNTER — OFFICE VISIT (OUTPATIENT)
Dept: URBAN - METROPOLITAN AREA CLINIC 33 | Facility: CLINIC | Age: 68
End: 2023-06-26

## 2023-06-26 RX ORDER — CARVEDILOL 12.5 MG/1
TABLET, FILM COATED ORAL
Qty: 0 | Refills: 0 | Status: ACTIVE | COMMUNITY
Start: 1900-01-01

## 2023-06-26 RX ORDER — ALBUTEROL SULFATE 108 UG/1
AEROSOL, METERED RESPIRATORY (INHALATION)
Qty: 6.7 GRAM | Status: ACTIVE | COMMUNITY

## 2023-06-26 RX ORDER — BUPROPION HYDROCHLORIDE 150 MG/1
TABLET, EXTENDED RELEASE ORAL
Qty: 90 TABLET | Status: ACTIVE | COMMUNITY

## 2023-06-26 RX ORDER — ATORVASTATIN CALCIUM 20 MG/1
TABLET, FILM COATED ORAL
Qty: 90 TABLET | Status: ACTIVE | COMMUNITY

## 2023-06-26 RX ORDER — TAMSULOSIN HYDROCHLORIDE 0.4 MG/1
CAPSULE ORAL
Qty: 90 CAPSULE | Status: ACTIVE | COMMUNITY

## 2023-06-26 RX ORDER — TESTOSTERONE CYPIONATE 200 MG/ML
INJECTION, SOLUTION INTRAMUSCULAR
Qty: 3 MILLILITER | Status: ACTIVE | COMMUNITY

## 2023-06-26 RX ORDER — NALOXEGOL OXALATE 25 MG/1
1 TABLET IN THE MORNING TABLET, FILM COATED ORAL ONCE A DAY
Qty: 90 TABLET | Refills: 3 | Status: ACTIVE | COMMUNITY
Start: 2023-02-27 | End: 2024-02-22

## 2023-06-26 RX ORDER — LISINOPRIL 40 MG/1
TABLET ORAL 1
Qty: 0 | Refills: 0 | Status: ACTIVE | COMMUNITY
Start: 1900-01-01

## 2023-06-26 NOTE — HPI-ABDOMINAL PAIN
Patient presents today for abdominal pain that is located --. He describes pain as a ---. Pain onset --, with episodes occurring every -- and lasting --.He has tried -- with -- relief of symptoms.   Last office visit (02/27/2023) Patient admits experiencing some left sided abdomen pain and flank pain. The patient had a  accident 10 days ago with some trauma ( fell off the truck ). Patient describe the pain as an burning sensation. Onset started about a month ago, and is present all day.  Denies any aggravating factors. Denies any alleviating factors.     He denies taking any OTC or prescribe medication for relief of his symptoms.

## 2023-06-26 NOTE — HPI-CIRRHOSIS
Patient presents today for a follow up of cirrhosis. The consultation with the liver transplant team was completed on 06/12/2023 and the notes have been scanned into He admits/denies any fatigue, easy bruising, decreased appetite, nausea, vomiting, edema, unintentional weight loss, or jaundice. Most recent labs were completed 06/12/2023. Admits/Denies any shortness of breath. Lab results from 06/12/2023 were as follows, WBC, HGB, HCT, MCV, and PLT, all results were normal. CMP, all results were normal except for the following, glucose was LOW @ 78, BCR was HIGH @ 27, NA was LOW @ 134, K was HIGH @ 5.2, ALKPHOS was HIGH @ 152. INR was normal @ 1.02.   MELD Score: 7  Complications of liver disease include:  Jaundice:  Hepatic Encephalopathy (lose of Brain function due to liver not working) : Yes/No  Ascites (abdominal fluid): Yes/No  Spontaneous Bacterial Peritonitis (ascites fluid infection): Yes/No  Variceal hemorrhage (bleeding within the veins due to high B/P): Yes/No  Portal Vein Thrombosis (blockage or narrowing of the portal vein): Yes/No  Muscle Mass Loss: Yes/No  Weight Loss: Yes/No  Sleeping habits: -- hours per night.  NAFLD Score:  MRI abdomen with and without contrast on 06/12/2023 : IMPRESSION : 1. Multiple treated lesions within the right hepatic lobe without evidence of residual viable tumor, LR TR nonviable lesions. 2. Slightly decreased overall size of the wedge-shaped area of relative hypoenhancement in segment 8 of the liver adjacent to the treated lesion favored to represent evolving posttreatment necrosis and/or early fibrosis. 3. No new focus of hepatocellular carcinoma or enhancing hepatic lesion identified, within the technical limitations of a motion degraded examination. 4. Stable mildly enlarged right cardiophrenic lymph node. No new or enlarging lymphadenopathy. Continued attention on follow-up as per routine clinical protocol. 5. Slightly increased mild diffuse dilatation of the main pancreatic duct measuring up to 6 mm in diameter (previously 4 mm). There is diffuse atrophy of the pancreatic parenchyma. No obstructing pancreatic head mass identified. Continued close attention on subsequent follow-up imaging. 6. Redemonstrated subacute compression fracture of the T12 vertebral body with persistent linear enhancement along the fracture line. 7. Morphologic changes of cirrhosis without overt evidence of stigmata of portal hypertension. Portal vein is patent. 8. Additional chronic and incidental findings as above.    Last office visit (02/27/2023) Patient presents today for a follow up of cirrhosis. He denies any recent episodes of fatigue, easy bruising, decreased appetite, nausea, vomiting, edema, unintentional weight loss, or jaundice.   Denies any shortness of breath.  He denies any alcohol intake since his last visit.   Most recent labs were performed 11/16/2022 MELD Score: 12   Complications of liver disease include: Jaundice: No  Hepatic Encephalopathy: No Ascites: No Spontaneous Bacterial Peritonitis: No Variceal hemorrhage: No Portal Vein Thrombosis: No Muscle Mass Loss: No Weight Loss: No Sleeping habits: Yes (He states he probably gets at least 4-5hrs of sleep)  US Renal (02.25.2023) Bilateral renal cysts as described. 7mm nonobstructing right renal calculus.  CT Chest w/o Contrast (02.16.2023) Interval solution of previously described 7mm left upper lobe pulmonary nodul. Other small bilateral pulmonary nodules are unchanged since 03.31.2021 and likely of benign etiology given stability. No new suspicious pulmonary nodules or masses. No definite metastatic diseases of the chest by noncontrast CT. Stable appearance of diffuse bilateral subpleural lung reticulation and pleural thickening suggesting fibrosis. No definite new active pulmonary diseases. Cirrhotic liver morphology and treatment changes of the liver. Consider reassessment contrast-ehanced abdominal MRI as warrented.   MRI Abdomen w/wo Contrast (02.25.2023) Interval Y-90 and microwave ablation of segment 8 liver lesion without viable tumor (LR TR nonviable).  Interval Y-90 of a segment 8 liver lesion with somewhat nodular perheral delayed enhancement, favored reflect treatment change. No definite residual suspicious enhancing lesion identified (LR TR nonviable). Stable more remote treated lesions of the segment 8 and segment 6 liver without viable tumor (LR TR nonviable). Previously describe arterial enhancing liver lesion are less conspicuous on the the current study likely secondary to motion degradation. Attention on follow-up imaging recommended (LR3). Nospecific enlarging cardiophrenic node. Mild cirrhotic liver morphology with mild changes of portal hypertension in the form of upper abdominal varices. Main portal vein is patent. New linear signal abnormality along the T12 vertebral body could reflect an age-indeterminated compression fracture. This can be further assessed with dedicated CT or MRI spine imaging as warrented. Moderate colonic stool burden suggests constipation. Other stable an incidental findings as above.

## 2023-08-21 ENCOUNTER — DASHBOARD ENCOUNTERS (OUTPATIENT)
Age: 68
End: 2023-08-21

## 2023-08-24 ENCOUNTER — OFFICE VISIT (OUTPATIENT)
Dept: URBAN - METROPOLITAN AREA CLINIC 33 | Facility: CLINIC | Age: 68
End: 2023-08-24

## 2023-08-24 RX ORDER — TAMSULOSIN HYDROCHLORIDE 0.4 MG/1
CAPSULE ORAL
Qty: 90 CAPSULE | COMMUNITY

## 2023-08-24 RX ORDER — NALOXEGOL OXALATE 25 MG/1
1 TABLET IN THE MORNING TABLET, FILM COATED ORAL ONCE A DAY
Qty: 90 TABLET | Refills: 3 | COMMUNITY
Start: 2023-02-27 | End: 2024-02-22

## 2023-08-24 RX ORDER — TESTOSTERONE CYPIONATE 200 MG/ML
INJECTION, SOLUTION INTRAMUSCULAR
Qty: 3 MILLILITER | COMMUNITY

## 2023-08-24 RX ORDER — CARVEDILOL 12.5 MG/1
TABLET, FILM COATED ORAL
Qty: 0 | Refills: 0 | COMMUNITY
Start: 1900-01-01

## 2023-08-24 RX ORDER — NALOXEGOL OXALATE 25 MG/1
1 TABLET IN THE MORNING TABLET, FILM COATED ORAL ONCE A DAY
Qty: 90 TABLET | Refills: 3 | OUTPATIENT

## 2023-08-24 RX ORDER — ALBUTEROL SULFATE 108 UG/1
AEROSOL, METERED RESPIRATORY (INHALATION)
Qty: 6.7 GRAM | COMMUNITY

## 2023-08-24 RX ORDER — BUPROPION HYDROCHLORIDE 150 MG/1
TABLET, EXTENDED RELEASE ORAL
Qty: 90 TABLET | COMMUNITY

## 2023-08-24 RX ORDER — LISINOPRIL 40 MG/1
TABLET ORAL 1
Qty: 0 | Refills: 0 | COMMUNITY
Start: 1900-01-01

## 2023-08-24 RX ORDER — ATORVASTATIN CALCIUM 20 MG/1
TABLET, FILM COATED ORAL
Qty: 90 TABLET | COMMUNITY

## 2025-03-10 ENCOUNTER — OFFICE VISIT (OUTPATIENT)
Dept: URBAN - METROPOLITAN AREA CLINIC 33 | Facility: CLINIC | Age: 70
End: 2025-03-10
Payer: COMMERCIAL

## 2025-03-10 VITALS — WEIGHT: 165 LBS | HEIGHT: 67 IN | HEART RATE: 83 BPM | BODY MASS INDEX: 25.9 KG/M2 | OXYGEN SATURATION: 94 %

## 2025-03-10 DIAGNOSIS — K74.60 CIRRHOSIS: ICD-10-CM

## 2025-03-10 DIAGNOSIS — K86.89 PANCREATIC DUCT DILATED: ICD-10-CM

## 2025-03-10 DIAGNOSIS — K59.03 DRUG INDUCED CONSTIPATION: ICD-10-CM

## 2025-03-10 DIAGNOSIS — T40.2X5A ADVERSE EFFECT OF OTHER OPIOIDS, INITIAL ENCOUNTER: ICD-10-CM

## 2025-03-10 DIAGNOSIS — K44.9 HIATAL HERNIA: ICD-10-CM

## 2025-03-10 DIAGNOSIS — R10.11 RIGHT UPPER QUADRANT ABDOMINAL PAIN: ICD-10-CM

## 2025-03-10 DIAGNOSIS — C22.0 HEPATOMA: ICD-10-CM

## 2025-03-10 DIAGNOSIS — K29.70 GASTRITIS: ICD-10-CM

## 2025-03-10 PROCEDURE — 99214 OFFICE O/P EST MOD 30 MIN: CPT | Performed by: INTERNAL MEDICINE

## 2025-03-10 RX ORDER — BUPROPION HYDROCHLORIDE 300 MG/1
1 TABLET IN THE MORNING TABLET ORAL ONCE A DAY
Qty: 90 TABLET | Status: ACTIVE | COMMUNITY

## 2025-03-10 RX ORDER — CARVEDILOL 12.5 MG/1
TABLET, FILM COATED ORAL
Qty: 0 | Refills: 0 | Status: ACTIVE | COMMUNITY
Start: 1900-01-01

## 2025-03-10 RX ORDER — ATORVASTATIN CALCIUM 20 MG/1
TABLET, FILM COATED ORAL
Qty: 90 TABLET | Status: ACTIVE | COMMUNITY

## 2025-03-10 RX ORDER — LISINOPRIL AND HYDROCHLOROTHIAZIDE 20; 12.5 MG/1; MG/1
1 TABLET TABLET ORAL ONCE A DAY
Status: ACTIVE | COMMUNITY

## 2025-03-10 RX ORDER — SPIRONOLACTONE 25 MG/1
1 TABLET TABLET, FILM COATED ORAL
Status: ACTIVE | COMMUNITY

## 2025-03-10 RX ORDER — FUROSEMIDE 20 MG/1
1 TABLET TABLET ORAL ONCE A DAY
Status: ON HOLD | COMMUNITY

## 2025-03-10 RX ORDER — METHADONE HYDROCHLORIDE 40 MG/1
3 TABLET IN 4 OUNCES OF WATER OR JUICE TABLET ORAL ONCE A DAY
Refills: 0 | Status: ACTIVE | COMMUNITY

## 2025-03-10 RX ORDER — LISINOPRIL 40 MG/1
TABLET ORAL 1
Qty: 0 | Refills: 0 | Status: DISCONTINUED | COMMUNITY
Start: 1900-01-01

## 2025-03-10 RX ORDER — TAMSULOSIN HYDROCHLORIDE 0.4 MG/1
CAPSULE ORAL
Qty: 90 CAPSULE | Status: ON HOLD | COMMUNITY

## 2025-03-10 RX ORDER — AMLODIPINE BESYLATE 5 MG/1
1 TABLET TABLET ORAL ONCE A DAY
Status: ACTIVE | COMMUNITY

## 2025-03-10 RX ORDER — ALBUTEROL SULFATE 108 UG/1
AEROSOL, METERED RESPIRATORY (INHALATION)
Qty: 6.7 GRAM | Status: ACTIVE | COMMUNITY

## 2025-03-10 RX ORDER — TESTOSTERONE CYPIONATE 200 MG/ML
INJECTION, SOLUTION INTRAMUSCULAR
Qty: 3 MILLILITER | Status: ON HOLD | COMMUNITY

## 2025-03-10 RX ORDER — NALOXEGOL OXALATE 25 MG/1
1 TABLET IN THE MORNING TABLET, FILM COATED ORAL ONCE A DAY
Qty: 90 TABLET | Refills: 3 | Status: ON HOLD | COMMUNITY

## 2025-03-10 NOTE — HPI-CIRRHOSIS
Patient presents today for a follow up of cirrhosis. Patient saw Dr. Louis's PA at [facility] on 12/24/2024. He had a CT 3 phase liver protocol with contrast on 12/24/2024 and those results are noted below. Patient admits currently taking Spironolactone 25 mg, daily and denies taking Lasix 20 mg, daily. He denies any fatigue, easy bruising, decreased appetite, nausea, vomiting, edema, unintentional weight loss, or jaundice. Most recent labs were completed 02/23/2024 and 02/13/2024 and the results are noted below. Admits any shortness of breath. Patient has a portable oxygen tank.   CT 3 phase liver protocol with contrast on 12/24/2024 : IMPRESSION : 1. Cirrhotic hepatic morphology. Patent portal vein. No abdominal ascites. 2. Multifocal posttreatment change within the right hepatic lobe, previously treated areas are without CT evidence of residual/recurrent viable tumor. 3. Stable 9 x 7 mm LR 4 lesion within subcapsular hepatic segment 6. Subcentimeter LR 3 lesions as indexed above. Recommend short-term imaging follow-up in 3 months to document stability 4. Enlarged right cardiophrenic angle lymph node similar to multiple prior exams, indeterminate. 5. Diffuse pancreatic atrophy and diffuse dilation of the main pancreatic duct. Mild dilation of the CBD. No CT evidence of obstructing stone/mass lesion. Appearance is overall similar to MRI 9/19/2023. Further evaluation could be performed with ERCP to exclude imaging occult obstructing process as clinically warranted. Unless the patient's specific circumstances suggest otherwise, any liver lesion 0.5 cm or less, any cystic kidney lesion less than 1.0 cm, and/or any adrenal lesion 1.0 cm or less not otherwise characterized in this report as possessing suspicious or indeterminate imaging features is/are most likely to be benign and do not require follow-up imaging or biopsy.   Lab results from 02/23/2024 were as follows :  CBC W/DIFF, all results normal. CMP, all results were normal except for the following, glucose was HIGH @ 101. Lipid panel was normal. A1C was HIGH @ 6.5.    Lab results from 02/13/2024 were as follows :  CMP, all results were normal except for the following, BUN was HIGH @ 43, BUN/creatinine ratio was HIGH @ 38, potassium was HIGH @ 5.4. INR was HIGH @ 1.4. PT was HIGH @ 14.1. AFP was normal.  MELD Score:  MELD 3.0 - 10. Original MELD = 11.  Complications of liver disease include:  Jaundice:  Hepatic Encephalopathy (lose of Brain function due to liver not working) : No  Ascites (abdominal fluid): No  Spontaneous Bacterial Peritonitis (ascites fluid infection): No  Variceal hemorrhage (bleeding within the veins due to high B/P): No  Portal Vein Thrombosis (blockage or narrowing of the portal vein): No  Muscle Mass Loss: No  Weight Loss: No  Sleeping habits: 4-5 hours per night.  NAFLD Score:  MRI abdomen with and without contrast on 06/12/2023 : IMPRESSION : 1. Multiple treated lesions within the right hepatic lobe without evidence of residual viable tumor, LR TR nonviable lesions. 2. Slightly decreased overall size of the wedge-shaped area of relative hypoenhancement in segment 8 of the liver adjacent to the treated lesion favored to represent evolving posttreatment necrosis and/or early fibrosis. 3. No new focus of hepatocellular carcinoma or enhancing hepatic lesion identified, within the technical limitations of a motion degraded examination. 4. Stable mildly enlarged right cardiophrenic lymph node. No new or enlarging lymphadenopathy. Continued attention on follow-up as per routine clinical protocol. 5. Slightly increased mild diffuse dilatation of the main pancreatic duct measuring up to 6 mm in diameter (previously 4 mm). There is diffuse atrophy of the pancreatic parenchyma. No obstructing pancreatic head mass identified. Continued close attention on subsequent follow-up imaging. 6. Redemonstrated subacute compression fracture of the T12 vertebral body with persistent linear enhancement along the fracture line. 7. Morphologic changes of cirrhosis without overt evidence of stigmata of portal hypertension. Portal vein is patent. 8. Additional chronic and incidental findings as above.   Last office visit (02/27/2023) Patient presents today for a follow up of cirrhosis. He denies any recent episodes of fatigue, easy bruising, decreased appetite, nausea, vomiting, edema, unintentional weight loss, or jaundice.   Denies any shortness of breath.  He denies any alcohol intake since his last visit.   Most recent labs were performed 11/16/2022 MELD Score: 12   Complications of liver disease include: Jaundice: No  Hepatic Encephalopathy: No Ascites: No Spontaneous Bacterial Peritonitis: No Variceal hemorrhage: No Portal Vein Thrombosis: No Muscle Mass Loss: No Weight Loss: No Sleeping habits: Yes (He states he probably gets at least 4-5hrs of sleep)  US Renal (02.25.2023) Bilateral renal cysts as described. 7mm nonobstructing right renal calculus.  CT Chest w/o Contrast (02.16.2023) Interval solution of previously described 7mm left upper lobe pulmonary nodul. Other small bilateral pulmonary nodules are unchanged since 03.31.2021 and likely of benign etiology given stability. No new suspicious pulmonary nodules or masses. No definite metastatic diseases of the chest by noncontrast CT. Stable appearance of diffuse bilateral subpleural lung reticulation and pleural thickening suggesting fibrosis. No definite new active pulmonary diseases. Cirrhotic liver morphology and treatment changes of the liver. Consider reassessment contrast-ehanced abdominal MRI as warrented.   MRI Abdomen w/wo Contrast (02.25.2023) Interval Y-90 and microwave ablation of segment 8 liver lesion without viable tumor (LR TR nonviable).  Interval Y-90 of a segment 8 liver lesion with somewhat nodular perheral delayed enhancement, favored reflect treatment change. No definite residual suspicious enhancing lesion identified (LR TR nonviable). Stable more remote treated lesions of the segment 8 and segment 6 liver without viable tumor (LR TR nonviable). Previously describe arterial enhancing liver lesion are less conspicuous on the the current study likely secondary to motion degradation. Attention on follow-up imaging recommended (LR3). Nospecific enlarging cardiophrenic node. Mild cirrhotic liver morphology with mild changes of portal hypertension in the form of upper abdominal varices. Main portal vein is patent. New linear signal abnormality along the T12 vertebral body could reflect an age-indeterminated compression fracture. This can be further assessed with dedicated CT or MRI spine imaging as warrented. Moderate colonic stool burden suggests constipation. Other stable an incidental findings as above.

## 2025-03-25 PROBLEM — 136801000119102: Status: ACTIVE | Noted: 2025-03-25

## 2025-03-25 PROBLEM — 292045009: Status: ACTIVE | Noted: 2025-03-25

## 2025-03-25 PROBLEM — 235969000: Status: ACTIVE | Noted: 2025-03-25
